# Patient Record
Sex: FEMALE | Race: WHITE | NOT HISPANIC OR LATINO | Employment: FULL TIME | URBAN - METROPOLITAN AREA
[De-identification: names, ages, dates, MRNs, and addresses within clinical notes are randomized per-mention and may not be internally consistent; named-entity substitution may affect disease eponyms.]

---

## 2018-10-14 ENCOUNTER — OFFICE VISIT (OUTPATIENT)
Dept: URGENT CARE | Facility: PHYSICIAN GROUP | Age: 43
End: 2018-10-14
Payer: OTHER GOVERNMENT

## 2018-10-14 VITALS
DIASTOLIC BLOOD PRESSURE: 80 MMHG | HEIGHT: 64 IN | SYSTOLIC BLOOD PRESSURE: 130 MMHG | TEMPERATURE: 95.7 F | HEART RATE: 77 BPM | WEIGHT: 235 LBS | BODY MASS INDEX: 40.12 KG/M2 | OXYGEN SATURATION: 96 % | RESPIRATION RATE: 16 BRPM

## 2018-10-14 DIAGNOSIS — L73.2 HIDRADENITIS SUPPURATIVA OF LEFT AXILLA: ICD-10-CM

## 2018-10-14 PROCEDURE — 99203 OFFICE O/P NEW LOW 30 MIN: CPT | Performed by: PHYSICIAN ASSISTANT

## 2018-10-14 RX ORDER — PROPRANOLOL HYDROCHLORIDE 20 MG/1
TABLET ORAL
COMMUNITY
Start: 2018-08-22

## 2018-10-14 RX ORDER — DOXYCYCLINE HYCLATE 100 MG
TABLET ORAL
COMMUNITY
Start: 2018-08-22

## 2018-10-14 ASSESSMENT — ENCOUNTER SYMPTOMS
FEVER: 0
CHILLS: 0
SENSORY CHANGE: 0
NAUSEA: 0
SHORTNESS OF BREATH: 0
VOMITING: 0
MYALGIAS: 0
FOCAL WEAKNESS: 0

## 2018-10-14 NOTE — PROGRESS NOTES
"Subjective:   Zeina Tubbs is a 43 y.o. female who presents for Cyst (under arm; popped 9 days ago )        Cyst   This is a new problem. The current episode started 1 to 4 weeks ago. Pertinent negatives include no chills, fever, myalgias, nausea, rash or vomiting.     Pt would like check up for PMH of hydraenitis suppurativa - notes lesion to left axilla that \"popped\" 9d ago, she then noted clear drainage from wound, has been tx'ing w/ abx oint OTC, denies feeling sick, denies fever/chills/body aches/nauesa. Has stated doxy 100mg qd she has on hand from PCP 4d ago. Has clinda topical but has not felt the need to use. Denies PMH of cx MRSA from wound. Denies any erythema or purulent appearing drainage now or recently.      Review of Systems   Constitutional: Negative for chills and fever.   Respiratory: Negative for shortness of breath.    Gastrointestinal: Negative for nausea and vomiting.   Musculoskeletal: Negative for myalgias.   Skin: Negative for itching and rash.        POS for lesion to left axilla   Neurological: Negative for sensory change and focal weakness.     Allergies   Allergen Reactions   • Minocycline    • Sulfa Drugs       Objective:   /80 (BP Location: Left arm, Patient Position: Sitting, BP Cuff Size: Adult)   Pulse 77   Temp (!) 35.4 °C (95.7 °F) (Temporal)   Resp 16   Ht 1.626 m (5' 4\")   Wt 106.6 kg (235 lb)   SpO2 96%   BMI 40.34 kg/m²   Physical Exam   Constitutional: She is oriented to person, place, and time. She appears well-developed and well-nourished. No distress.   HENT:   Head: Normocephalic and atraumatic.   Right Ear: External ear normal.   Left Ear: External ear normal.   Nose: Nose normal.   Eyes: Conjunctivae and lids are normal. Right eye exhibits no discharge. Left eye exhibits no discharge. No scleral icterus.   Neck: Neck supple.   Pulmonary/Chest: Effort normal. No respiratory distress.   Musculoskeletal: Normal range of motion.   Neurological: She is alert " and oriented to person, place, and time. She is not disoriented.   Skin: Skin is warm and dry. No rash noted. She is not diaphoretic. No erythema. No pallor.        Psychiatric: Her speech is normal and behavior is normal.   Nursing note and vitals reviewed.        Assessment/Plan:   Assessment    1. Hidradenitis suppurativa of left axilla    Other orders  - propranolol (INDERAL) 20 MG Tab;   - doxycycline (VIBRAMYCIN) 100 MG Tab;   Continue current management with daily doxycycline, with onset of fevers chills body aches her appearance of redness extending out from lesion consider contacting primary care for increased dose, no concerns today  Return to clinic with lack of resolution or progression of symptoms.    Differential diagnosis, natural history, supportive care, and indications for immediate follow-up discussed.

## 2019-10-29 ENCOUNTER — OFFICE VISIT (OUTPATIENT)
Dept: URGENT CARE | Facility: PHYSICIAN GROUP | Age: 44
End: 2019-10-29
Payer: OTHER GOVERNMENT

## 2019-10-29 ENCOUNTER — HOSPITAL ENCOUNTER (OUTPATIENT)
Dept: RADIOLOGY | Facility: MEDICAL CENTER | Age: 44
End: 2019-10-29
Attending: PHYSICIAN ASSISTANT
Payer: OTHER GOVERNMENT

## 2019-10-29 VITALS
HEIGHT: 64 IN | OXYGEN SATURATION: 94 % | WEIGHT: 230 LBS | BODY MASS INDEX: 39.27 KG/M2 | RESPIRATION RATE: 16 BRPM | HEART RATE: 84 BPM | TEMPERATURE: 97.4 F | SYSTOLIC BLOOD PRESSURE: 122 MMHG | DIASTOLIC BLOOD PRESSURE: 84 MMHG

## 2019-10-29 DIAGNOSIS — M54.50 LUMBAR PAIN: ICD-10-CM

## 2019-10-29 DIAGNOSIS — S39.012A LUMBAR STRAIN, INITIAL ENCOUNTER: ICD-10-CM

## 2019-10-29 PROCEDURE — 72100 X-RAY EXAM L-S SPINE 2/3 VWS: CPT

## 2019-10-29 PROCEDURE — 99214 OFFICE O/P EST MOD 30 MIN: CPT | Performed by: PHYSICIAN ASSISTANT

## 2019-10-29 RX ORDER — CYCLOBENZAPRINE HCL 5 MG
5-10 TABLET ORAL 3 TIMES DAILY PRN
Qty: 30 TAB | Refills: 0 | Status: SHIPPED | OUTPATIENT
Start: 2019-10-29 | End: 2019-11-18

## 2019-10-29 RX ORDER — PREDNISONE 20 MG/1
20 TABLET ORAL DAILY
Qty: 5 TAB | Refills: 0 | Status: SHIPPED | OUTPATIENT
Start: 2019-10-29 | End: 2019-11-03

## 2019-10-29 RX ORDER — PAROXETINE HYDROCHLORIDE 20 MG/1
TABLET, FILM COATED ORAL
Refills: 4 | COMMUNITY
Start: 2019-09-13

## 2019-10-29 ASSESSMENT — ENCOUNTER SYMPTOMS
BACK PAIN: 1
FEVER: 0
PARESTHESIAS: 0
TINGLING: 0
ABDOMINAL PAIN: 0
LEG PAIN: 0
PARESIS: 0
HEADACHES: 0
WEIGHT LOSS: 0
NUMBNESS: 0
PERIANAL NUMBNESS: 0
WEAKNESS: 0
BOWEL INCONTINENCE: 0

## 2019-10-29 NOTE — PROGRESS NOTES
Subjective:   Zeina Tubbs is a 44 y.o. female who presents for Low Back Pain (possible [id number]/neck pain)        Patient has struggled with low back pain over the years.  She does have history of sciatica.  Symptoms have been well controlled.  She recently reinjured back during heavy lifting while moving 6 weeks ago.  Symptoms initially improved but then worsened.  Since initial injury they have waxed and waned.  She feels like heat and naproxen are not adequately relieving symptoms.    Back Pain   This is a new problem. The current episode started more than 1 month ago. The problem has been waxing and waning since onset. The pain is present in the lumbar spine and sacro-iliac. The pain does not radiate. The pain is at a severity of 6/10. The pain is moderate. The pain is the same all the time. The symptoms are aggravated by bending, twisting, sitting and position. Pertinent negatives include no abdominal pain, bladder incontinence, bowel incontinence, chest pain, dysuria, fever, headaches, leg pain, numbness, paresis, paresthesias, pelvic pain, perianal numbness, tingling, weakness or weight loss. Risk factors include obesity. She has tried NSAIDs, heat and bed rest for the symptoms. The treatment provided no relief.     Review of Systems   Constitutional: Negative for fever and weight loss.   Cardiovascular: Negative for chest pain.   Gastrointestinal: Negative for abdominal pain and bowel incontinence.   Genitourinary: Negative for bladder incontinence, dysuria and pelvic pain.   Musculoskeletal: Positive for back pain.   Neurological: Negative for tingling, weakness, numbness, headaches and paresthesias.       PMH:  has no past medical history of Diabetes.  MEDS:   Current Outpatient Medications:   •  PARoxetine (PAXIL) 20 MG Tab, TK 1 T PO D, Disp: , Rfl: 4  •  predniSONE (DELTASONE) 20 MG Tab, Take 1 Tab by mouth every day for 5 days., Disp: 5 Tab, Rfl: 0  •  cyclobenzaprine (FLEXERIL) 5 MG tablet,  "Take 1-2 Tabs by mouth 3 times a day as needed for up to 20 days., Disp: 30 Tab, Rfl: 0  •  propranolol (INDERAL) 20 MG Tab, , Disp: , Rfl:   •  naproxen (NAPROSYN) 500 MG TABS, Take 500 mg by mouth 2 times a day, with meals., Disp: , Rfl:   •  doxycycline (VIBRAMYCIN) 100 MG Tab, , Disp: , Rfl:   •  alprazolam (XANAX) 0.25 MG TABS, Take 0.25 mg by mouth at bedtime as needed., Disp: , Rfl:   •  vitamin D, Ergocalciferol, (DRISDOL) 33731 UNITS CAPS capsule, Take  by mouth every 7 days., Disp: , Rfl:   ALLERGIES:   Allergies   Allergen Reactions   • Minocycline    • Sulfa Drugs      SURGHX: History reviewed. No pertinent surgical history.  SOCHX:  reports that she has never smoked. She has never used smokeless tobacco. She reports that she does not drink alcohol.  FH: Family history was reviewed, no pertinent findings to report   Objective:   /84   Pulse 84   Temp 36.3 °C (97.4 °F) (Temporal)   Resp 16   Ht 1.626 m (5' 4\")   Wt 104.3 kg (230 lb)   SpO2 94%   BMI 39.48 kg/m²   Physical Exam   Constitutional: She is oriented to person, place, and time. She appears well-developed and well-nourished.  Non-toxic appearance. No distress.   HENT:   Head: Normocephalic and atraumatic.   Right Ear: External ear normal.   Left Ear: External ear normal.   Nose: Nose normal.   Eyes: Conjunctivae and lids are normal.   Neck: Neck supple.   Cardiovascular: Normal rate and regular rhythm.   Pulmonary/Chest: Effort normal and breath sounds normal. No respiratory distress.   Abdominal: Normal appearance.   Musculoskeletal:   Back:  General: No asymmetry, bruising, or erythema appreciated  ROM: flexion 20°, extension 30°, lateral bend 20°, lateral twist 30°  Palpation: L4 and L5 moderately tender to palpation. Lumbar paraspinals tender to palpation.  no significant scoliosis appreciated  Strength: 5/5 hip flexion/extension  Special tests: Straight leg raise -   Neuro: Sensation intact and equal bilaterally in LE's "   Neurological: She is alert and oriented to person, place, and time. No cranial nerve deficit or sensory deficit.   Reflex Scores:       Patellar reflexes are 2+ on the right side and 2+ on the left side.  Skin: Skin is warm, dry and intact. Capillary refill takes less than 2 seconds.   Psychiatric: She has a normal mood and affect. Her speech is normal and behavior is normal. Judgment and thought content normal. Cognition and memory are normal.   Vitals reviewed.        Assessment/Plan:   1. Lumbar strain, initial encounter  - predniSONE (DELTASONE) 20 MG Tab; Take 1 Tab by mouth every day for 5 days.  Dispense: 5 Tab; Refill: 0  - cyclobenzaprine (FLEXERIL) 5 MG tablet; Take 1-2 Tabs by mouth 3 times a day as needed for up to 20 days.  Dispense: 30 Tab; Refill: 0    2. Lumbar pain  - DX-LUMBAR SPINE-2 OR 3 VIEWS; Future    Other orders  - PARoxetine (PAXIL) 20 MG Tab; TK 1 T PO D; Refill: 4    Due to duration of symptoms I will obtain an x-ray to further evaluate.  XR: anterolisthesis of L4 possibly by my read  Radiology review:    FINDINGS:  The lumbar vertebral bodies are normal in height.    Subluxation at L4-5 measures 5 mm    Disk spaces are maintained.    There is multilevel facet arthropathy.    The visualized portions of the abdomen are within normal limits.      Impression       1.  L4-5 subluxation measuring 5 mm which is likely degenerative in etiology.    2.  Multilevel facet arthropathy     Images reviewed with patient.  Advised her that subluxation is most likely degenerative versus acute.  However I would like her to be reevaluated by her PCP next week.     No red flag symptoms.  No bony tenderness or significant trauma.  Radiological imaging not indicated at this time.    Patient instructed to rest and avoid aggravating activities.  Apply warm, damp heat to the affected area and perform gentle stretches as instructed in clinic.  As symptoms improve patient may increase activity.    Muscle relaxers  as needed for muscle spasm.  Patient was advised not to drive, operate machinery, or drink alcohol taking this medication.  Start 5-day course of prednisone.  She may transition back to naproxen after completing course.    If symptoms worsen or fail to improve patient instructed to follow-up with PCP or return to clinic for reevaluation.  If patient develops red flag symptoms such as urinary retention, loss of bowel or bladder control, perianal numbness or tingling, lower extremity weakness-patient was instructed to go to the ED for further evaluation.    Differential diagnosis, natural history, supportive care, and indications for immediate follow-up discussed.

## 2019-10-29 NOTE — LETTER
October 29, 2019    To Whom It May Concern:         This is confirmation that Zeina Tubbs attended her scheduled appointment with Barry Pope P.A.-C. on 10/29/19. Please excuse her from work on 10/28-10/30.         If you have any questions please do not hesitate to call me at the phone number listed below.    Sincerely,          Barry Pope P.A.-C.  [phone]

## 2019-11-05 ENCOUNTER — OFFICE VISIT (OUTPATIENT)
Dept: URGENT CARE | Facility: PHYSICIAN GROUP | Age: 44
End: 2019-11-05
Payer: OTHER GOVERNMENT

## 2019-11-05 VITALS
HEART RATE: 80 BPM | OXYGEN SATURATION: 96 % | BODY MASS INDEX: 39.95 KG/M2 | HEIGHT: 64 IN | WEIGHT: 234 LBS | SYSTOLIC BLOOD PRESSURE: 126 MMHG | DIASTOLIC BLOOD PRESSURE: 84 MMHG | TEMPERATURE: 97.6 F

## 2019-11-05 DIAGNOSIS — M54.42 ACUTE LEFT-SIDED LOW BACK PAIN WITH LEFT-SIDED SCIATICA: ICD-10-CM

## 2019-11-05 PROCEDURE — 99214 OFFICE O/P EST MOD 30 MIN: CPT | Performed by: PHYSICIAN ASSISTANT

## 2019-11-05 RX ORDER — KETOROLAC TROMETHAMINE 30 MG/ML
30 INJECTION, SOLUTION INTRAMUSCULAR; INTRAVENOUS ONCE
Status: COMPLETED | OUTPATIENT
Start: 2019-11-05 | End: 2019-11-05

## 2019-11-05 RX ORDER — IBUPROFEN 800 MG/1
800 TABLET ORAL EVERY 8 HOURS PRN
COMMUNITY

## 2019-11-05 RX ORDER — KETOROLAC TROMETHAMINE 30 MG/ML
30 INJECTION, SOLUTION INTRAMUSCULAR; INTRAVENOUS ONCE
Status: DISCONTINUED | OUTPATIENT
Start: 2019-11-05 | End: 2019-11-05

## 2019-11-05 RX ADMIN — KETOROLAC TROMETHAMINE 30 MG: 30 INJECTION, SOLUTION INTRAMUSCULAR; INTRAVENOUS at 09:21

## 2019-11-05 ASSESSMENT — ENCOUNTER SYMPTOMS
BLURRED VISION: 0
WEIGHT LOSS: 0
CHILLS: 0
ABDOMINAL PAIN: 0
TINGLING: 0
SHORTNESS OF BREATH: 0
PALPITATIONS: 0
VOMITING: 0
HEADACHES: 0
FEVER: 0
SENSORY CHANGE: 0
NAUSEA: 0
BACK PAIN: 1

## 2019-11-05 NOTE — PROGRESS NOTES
Subjective:      Zeina Tubbs is a 44 y.o. female who presents with Low Back Pain (pain not resolved, L sided low back pain radiating down leg)    HPI:  Patient was initially seen on 10/29/2019 for evaluation of low back pain.  Approximately 2 months ago she injured her back for doing some heavy lifting during a move.  Patient had an x-ray done at her previous visit which showed L4-5 subluxation measuring 5 mm and multilevel facet arthropathy.  Exam is most consistent with muscular strain, however.  Patient was treated with a short course of prednisone and given cyclobenzaprine to use as needed.  Patient states that she was improving for short time and the pain in her back itself actually has gotten better but now she is getting pain that shoots down into her posterior left thigh to the level of her knee.  She is unable to get in to see her PCP until January 3.  No bowel/bladder incontinence, fever/chills, abdominal pain, urinary symptoms, focal weakness, or saddle anesthesia.  No new trauma      Review of Systems   Constitutional: Negative for chills, fever and weight loss.   Eyes: Negative for blurred vision.   Respiratory: Negative for shortness of breath.    Cardiovascular: Negative for chest pain and palpitations.   Gastrointestinal: Negative for abdominal pain, nausea and vomiting.   Musculoskeletal: Positive for back pain.   Neurological: Negative for tingling, sensory change and headaches.       PMH:  has no past medical history of Diabetes.  MEDS:   Current Outpatient Medications:   •  ibuprofen (MOTRIN) 800 MG Tab, Take 800 mg by mouth every 8 hours as needed., Disp: , Rfl:   •  cyclobenzaprine (FLEXERIL) 5 MG tablet, Take 1-2 Tabs by mouth 3 times a day as needed for up to 20 days., Disp: 30 Tab, Rfl: 0  •  PARoxetine (PAXIL) 20 MG Tab, TK 1 T PO D, Disp: , Rfl: 4  •  propranolol (INDERAL) 20 MG Tab, , Disp: , Rfl:   •  doxycycline (VIBRAMYCIN) 100 MG Tab, , Disp: , Rfl:   •  naproxen (NAPROSYN) 500 MG  "TABS, Take 500 mg by mouth 2 times a day, with meals., Disp: , Rfl:   •  alprazolam (XANAX) 0.25 MG TABS, Take 0.25 mg by mouth at bedtime as needed., Disp: , Rfl:   •  vitamin D, Ergocalciferol, (DRISDOL) 30849 UNITS CAPS capsule, Take  by mouth every 7 days., Disp: , Rfl:     Current Facility-Administered Medications:   •  ketorolac (TORADOL) injection 30 mg, 30 mg, Intramuscular, Once, MYLES RamirezAKAEL  ALLERGIES:   Allergies   Allergen Reactions   • Minocycline    • Sulfa Drugs      SURGHX: No past surgical history on file.  SOCHX:  reports that she has never smoked. She has never used smokeless tobacco. She reports that she does not drink alcohol.  FH: Family history was reviewed, no pertinent findings to report     Objective:     /84   Pulse 80   Temp 36.4 °C (97.6 °F) (Temporal)   Ht 1.626 m (5' 4\")   Wt 106.1 kg (234 lb)   SpO2 96%   BMI 40.17 kg/m²      Physical Exam  Constitutional:       Appearance: She is well-developed.   HENT:      Head: Normocephalic and atraumatic.      Right Ear: External ear normal.      Left Ear: External ear normal.   Eyes:      Conjunctiva/sclera: Conjunctivae normal.      Pupils: Pupils are equal, round, and reactive to light.   Cardiovascular:      Rate and Rhythm: Normal rate and regular rhythm.      Heart sounds: Normal heart sounds. No murmur.   Pulmonary:      Effort: Pulmonary effort is normal.      Breath sounds: Normal breath sounds. No wheezing.   Musculoskeletal:      Lumbar back: She exhibits decreased range of motion, tenderness and spasm. She exhibits no bony tenderness.        Back:    Skin:     General: Skin is warm and dry.      Capillary Refill: Capillary refill takes less than 2 seconds.   Neurological:      Mental Status: She is alert and oriented to person, place, and time.   Psychiatric:         Behavior: Behavior normal.         Judgment: Judgment normal.            Assessment/Plan:     1. Acute left-sided low back pain with " left-sided sciatica  - ketorolac (TORADOL) injection 30 mg  -Alternate ice/heat  -Light stretching exercises as discussed  -Continue cyclobenzaprine as needed  -Follow-up with sports medicine for further evaluation          Differential Diagnosis, natural history, and supportive care discussed. Return to the Urgent Care or follow up with your PCP if symptoms fail to resolve, or for any new or worsening symptoms. Emergency room precautions discussed. Patient and/or family appears understanding of information.

## 2019-11-05 NOTE — LETTER
November 5, 2019         Patient: Zeina Tubbs   YOB: 1975   Date of Visit: 11/5/2019           To Whom it May Concern:    Zeina Tubbs was seen in my clinic on 11/5/2019.     If you have any questions or concerns, please don't hesitate to call.        Sincerely,           Laverne Garcia P.A.-C.  Electronically Signed

## 2022-05-17 ENCOUNTER — OFFICE VISIT (OUTPATIENT)
Dept: URGENT CARE | Facility: PHYSICIAN GROUP | Age: 47
End: 2022-05-17
Payer: OTHER GOVERNMENT

## 2022-05-17 VITALS
HEIGHT: 64 IN | HEART RATE: 80 BPM | WEIGHT: 235 LBS | RESPIRATION RATE: 16 BRPM | SYSTOLIC BLOOD PRESSURE: 118 MMHG | TEMPERATURE: 98 F | BODY MASS INDEX: 40.12 KG/M2 | OXYGEN SATURATION: 95 % | DIASTOLIC BLOOD PRESSURE: 74 MMHG

## 2022-05-17 DIAGNOSIS — J11.1 INFLUENZA: ICD-10-CM

## 2022-05-17 PROCEDURE — 99213 OFFICE O/P EST LOW 20 MIN: CPT | Performed by: PHYSICIAN ASSISTANT

## 2022-05-17 RX ORDER — ALBUTEROL SULFATE 90 UG/1
2 AEROSOL, METERED RESPIRATORY (INHALATION) EVERY 6 HOURS PRN
Qty: 8.5 G | Refills: 0 | Status: SHIPPED | OUTPATIENT
Start: 2022-05-17

## 2022-05-17 RX ORDER — BENZONATATE 100 MG/1
200 CAPSULE ORAL 3 TIMES DAILY PRN
Qty: 60 CAPSULE | Refills: 0 | Status: SHIPPED | OUTPATIENT
Start: 2022-05-17

## 2022-05-17 ASSESSMENT — ENCOUNTER SYMPTOMS
MYALGIAS: 1
ABDOMINAL PAIN: 0
SORE THROAT: 0
COUGH: 1
PALPITATIONS: 0
DIARRHEA: 0
VOMITING: 0
FEVER: 1
NAUSEA: 1
WHEEZING: 0
CHILLS: 1
DIZZINESS: 0
DIAPHORESIS: 0
HEADACHES: 1
SPUTUM PRODUCTION: 1
SINUS PAIN: 0
SHORTNESS OF BREATH: 0

## 2022-05-17 NOTE — LETTER
[facility] URGENT [facility] [place] [id number] [facility] [id number]     May 17, 2022    Patient: Zeina Tubbs   YOB: 1975   Date of Visit: 5/17/2022       To Whom It May Concern:    Zeina Tubbs was seen and treated in our department on 5/17/2022.  Please excuse her absence from work on 5/17/2022 through 5/18/2022.  Cleared to return to work on 5/19/2022 as long as she is afebrile.  Call with questions or concerns at [phone].    Sincerely,     Juni Reynolds P.A.-C.

## 2022-05-17 NOTE — PROGRESS NOTES
Subjective:     CHIEF COMPLAINT  Chief Complaint   Patient presents with   • Cough     HEADACHE, FATIGUE, NAUSEA, SOB X4 DAYS       HPI  Zeina Tubbs is a very pleasant 46 y.o. female who presents to the clinic with flulike symptoms x4 days.  Patient's son was seen in clinic last Thursday.  He tested positive for influenza A.  Her symptoms began the following day.  She describes generalized body aches, chills, fatigue and fever.  She has also had sinus congestion and cough.  Mild shortness of breath after coughing fits.  Cough was dry at the onset and is gradually becoming more productive.  She has been taking Mucinex, DayQuil, NyQuil, Robitussin and OTC antihistamines with minimal improvement.    REVIEW OF SYSTEMS  Review of Systems   Constitutional: Positive for chills, fever and malaise/fatigue. Negative for diaphoresis.   HENT: Positive for congestion. Negative for ear pain, sinus pain and sore throat.    Respiratory: Positive for cough and sputum production. Negative for shortness of breath and wheezing.    Cardiovascular: Negative for chest pain and palpitations.   Gastrointestinal: Positive for nausea. Negative for abdominal pain, diarrhea and vomiting.   Musculoskeletal: Positive for myalgias.   Neurological: Positive for headaches. Negative for dizziness.   Endo/Heme/Allergies: Negative for environmental allergies.       PAST MEDICAL HISTORY  There are no problems to display for this patient.      SURGICAL HISTORY  patient denies any surgical history    ALLERGIES  Allergies   Allergen Reactions   • Minocycline    • Sulfa Drugs        CURRENT MEDICATIONS  Home Medications     Reviewed by Juni Reynolds P.A.-C. (Physician Assistant) on 05/17/22 at 1504  Med List Status: <None>   Medication Last Dose Status   alprazolam (XANAX) 0.25 MG TABS Not Taking Active   doxycycline (VIBRAMYCIN) 100 MG Tab PRN Active   ibuprofen (MOTRIN) 800 MG Tab PRN Active   naproxen (NAPROSYN) 500 MG TABS PRN Active   PARoxetine  "(PAXIL) 20 MG Tab Taking Active   propranolol (INDERAL) 20 MG Tab Taking Active   vitamin D, Ergocalciferol, (DRISDOL) 38374 UNITS CAPS capsule Not Taking Active                SOCIAL HISTORY  Social History     Tobacco Use   • Smoking status: Never Smoker   • Smokeless tobacco: Never Used   Vaping Use   • Vaping Use: Never used   Substance and Sexual Activity   • Alcohol use: No   • Drug use: Never   • Sexual activity: Not on file       FAMILY HISTORY  History reviewed. No pertinent family history.       Objective:     VITAL SIGNS: /74   Pulse 80   Temp 36.7 °C (98 °F) (Temporal)   Resp 16   Ht 1.626 m (5' 4\")   Wt 107 kg (235 lb)   SpO2 95%   BMI 40.34 kg/m²     PHYSICAL EXAM  Physical Exam  Constitutional:       General: She is not in acute distress.     Appearance: Normal appearance. She is not ill-appearing, toxic-appearing or diaphoretic.   HENT:      Head: Normocephalic and atraumatic.      Right Ear: Tympanic membrane, ear canal and external ear normal.      Left Ear: Tympanic membrane, ear canal and external ear normal.      Nose: Congestion present. No rhinorrhea.      Mouth/Throat:      Mouth: Mucous membranes are moist.      Pharynx: No oropharyngeal exudate or posterior oropharyngeal erythema.   Eyes:      Conjunctiva/sclera: Conjunctivae normal.   Cardiovascular:      Rate and Rhythm: Normal rate and regular rhythm.      Pulses: Normal pulses.      Heart sounds: Normal heart sounds.   Pulmonary:      Effort: Pulmonary effort is normal.      Breath sounds: Normal breath sounds. No wheezing, rhonchi or rales.   Musculoskeletal:      Cervical back: Normal range of motion. No muscular tenderness.   Lymphadenopathy:      Cervical: No cervical adenopathy.   Skin:     General: Skin is warm and dry.      Capillary Refill: Capillary refill takes less than 2 seconds.   Neurological:      Mental Status: She is alert.   Psychiatric:         Mood and Affect: Mood normal.         Thought Content: " Thought content normal.         Assessment/Plan:     1. Influenza  - albuterol 108 (90 Base) MCG/ACT Aero Soln inhalation aerosol; Inhale 2 Puffs every 6 hours as needed for Shortness of Breath.  Dispense: 8.5 g; Refill: 0  - benzonatate (TESSALON) 100 MG Cap; Take 2 Capsules by mouth 3 times a day as needed for Cough.  Dispense: 60 Capsule; Refill: 0      MDM/Comments:    - It has been >48 hours since symptoms onset, no benefit to treating with Tamiflu  - Increased rest and fluids  - OTC ibuprofen or tylenol as needed for fever control  - Encouraged frequent handwashing for him and his entire family  - Encouraged to wear a mask in public until he is feeling better    - Advised to have any close contacts come in for flu testing promptly if they come down with any symptoms  - Discussed possible complication of pneumonia, encouraged to present to clinic or go to ER if cough does not resolve after 7-10 days, it becomes productive in nature, he experiences difficulty breathing, or there is worsening fever      Differential diagnosis, natural history, supportive care, and indications for immediate follow-up discussed. All questions answered. Patient agrees with the plan of care.    Follow-up as needed if symptoms worsen or fail to improve to PCP, Urgent care or Emergency Room.    I have personally reviewed prior external notes and test results pertinent to today's visit.  I have independently reviewed and interpreted all diagnostics ordered during this urgent care acute visit.   Discussed management options (risks,benefits, and alternatives to treatment). Pt expresses understanding and the treatment plan was agreed upon. Questions were encouraged and answered to pt's satisfaction.    Please note that this dictation was created using voice recognition software. I have made a reasonable attempt to correct obvious errors, but I expect that there are errors of grammar and possibly content that I did not discover before  finalizing the note.

## 2024-01-20 ENCOUNTER — HOSPITAL ENCOUNTER (OUTPATIENT)
Dept: LAB | Facility: MEDICAL CENTER | Age: 49
End: 2024-01-20
Attending: FAMILY MEDICINE
Payer: OTHER GOVERNMENT

## 2024-01-20 LAB
ALBUMIN SERPL BCP-MCNC: 4.2 G/DL (ref 3.2–4.9)
ALBUMIN/GLOB SERPL: 1.2 G/DL
ALP SERPL-CCNC: 60 U/L (ref 30–99)
ALT SERPL-CCNC: 23 U/L (ref 2–50)
ANION GAP SERPL CALC-SCNC: 11 MMOL/L (ref 7–16)
AST SERPL-CCNC: 19 U/L (ref 12–45)
BASOPHILS # BLD AUTO: 0.6 % (ref 0–1.8)
BASOPHILS # BLD: 0.05 K/UL (ref 0–0.12)
BILIRUB SERPL-MCNC: 0.5 MG/DL (ref 0.1–1.5)
BUN SERPL-MCNC: 11 MG/DL (ref 8–22)
CALCIUM ALBUM COR SERPL-MCNC: 9 MG/DL (ref 8.5–10.5)
CALCIUM SERPL-MCNC: 9.2 MG/DL (ref 8.5–10.5)
CHLORIDE SERPL-SCNC: 107 MMOL/L (ref 96–112)
CHOLEST SERPL-MCNC: 142 MG/DL (ref 100–199)
CO2 SERPL-SCNC: 21 MMOL/L (ref 20–33)
CREAT SERPL-MCNC: 0.61 MG/DL (ref 0.5–1.4)
EOSINOPHIL # BLD AUTO: 0.22 K/UL (ref 0–0.51)
EOSINOPHIL NFR BLD: 2.5 % (ref 0–6.9)
ERYTHROCYTE [DISTWIDTH] IN BLOOD BY AUTOMATED COUNT: 41.3 FL (ref 35.9–50)
EST. AVERAGE GLUCOSE BLD GHB EST-MCNC: 105 MG/DL
FASTING STATUS PATIENT QL REPORTED: NORMAL
GFR SERPLBLD CREATININE-BSD FMLA CKD-EPI: 111 ML/MIN/1.73 M 2
GLOBULIN SER CALC-MCNC: 3.4 G/DL (ref 1.9–3.5)
GLUCOSE SERPL-MCNC: 94 MG/DL (ref 65–99)
HBA1C MFR BLD: 5.3 % (ref 4–5.6)
HCT VFR BLD AUTO: 43.7 % (ref 37–47)
HDLC SERPL-MCNC: 49 MG/DL
HGB BLD-MCNC: 15.3 G/DL (ref 12–16)
IMM GRANULOCYTES # BLD AUTO: 0.02 K/UL (ref 0–0.11)
IMM GRANULOCYTES NFR BLD AUTO: 0.2 % (ref 0–0.9)
LDLC SERPL CALC-MCNC: 81 MG/DL
LYMPHOCYTES # BLD AUTO: 1.84 K/UL (ref 1–4.8)
LYMPHOCYTES NFR BLD: 21.3 % (ref 22–41)
MCH RBC QN AUTO: 31.6 PG (ref 27–33)
MCHC RBC AUTO-ENTMCNC: 35 G/DL (ref 32.2–35.5)
MCV RBC AUTO: 90.3 FL (ref 81.4–97.8)
MONOCYTES # BLD AUTO: 0.44 K/UL (ref 0–0.85)
MONOCYTES NFR BLD AUTO: 5.1 % (ref 0–13.4)
NEUTROPHILS # BLD AUTO: 6.08 K/UL (ref 1.82–7.42)
NEUTROPHILS NFR BLD: 70.3 % (ref 44–72)
NRBC # BLD AUTO: 0 K/UL
NRBC BLD-RTO: 0 /100 WBC (ref 0–0.2)
PLATELET # BLD AUTO: 367 K/UL (ref 164–446)
PMV BLD AUTO: 9.9 FL (ref 9–12.9)
POTASSIUM SERPL-SCNC: 4.2 MMOL/L (ref 3.6–5.5)
PROT SERPL-MCNC: 7.6 G/DL (ref 6–8.2)
RBC # BLD AUTO: 4.84 M/UL (ref 4.2–5.4)
SODIUM SERPL-SCNC: 139 MMOL/L (ref 135–145)
T4 SERPL-MCNC: 7.6 UG/DL (ref 4–12)
TRIGL SERPL-MCNC: 58 MG/DL (ref 0–149)
TSH SERPL DL<=0.005 MIU/L-ACNC: 0.47 UIU/ML (ref 0.38–5.33)
WBC # BLD AUTO: 8.7 K/UL (ref 4.8–10.8)

## 2024-01-20 PROCEDURE — 80061 LIPID PANEL: CPT

## 2024-01-20 PROCEDURE — 83036 HEMOGLOBIN GLYCOSYLATED A1C: CPT

## 2024-01-20 PROCEDURE — 36415 COLL VENOUS BLD VENIPUNCTURE: CPT

## 2024-01-20 PROCEDURE — 85025 COMPLETE CBC W/AUTO DIFF WBC: CPT

## 2024-01-20 PROCEDURE — 84443 ASSAY THYROID STIM HORMONE: CPT

## 2024-01-20 PROCEDURE — 80053 COMPREHEN METABOLIC PANEL: CPT

## 2024-01-20 PROCEDURE — 84436 ASSAY OF TOTAL THYROXINE: CPT

## 2024-09-07 ENCOUNTER — OFFICE VISIT (OUTPATIENT)
Dept: URGENT CARE | Facility: PHYSICIAN GROUP | Age: 49
End: 2024-09-07
Payer: OTHER GOVERNMENT

## 2024-09-07 VITALS
HEART RATE: 89 BPM | RESPIRATION RATE: 14 BRPM | BODY MASS INDEX: 39.4 KG/M2 | HEIGHT: 64 IN | OXYGEN SATURATION: 96 % | SYSTOLIC BLOOD PRESSURE: 114 MMHG | TEMPERATURE: 97.7 F | WEIGHT: 230.8 LBS | DIASTOLIC BLOOD PRESSURE: 80 MMHG

## 2024-09-07 DIAGNOSIS — R30.0 DYSURIA: ICD-10-CM

## 2024-09-07 DIAGNOSIS — N30.01 ACUTE CYSTITIS WITH HEMATURIA: ICD-10-CM

## 2024-09-07 LAB
APPEARANCE UR: NORMAL
BILIRUB UR STRIP-MCNC: NEGATIVE MG/DL
COLOR UR AUTO: YELLOW
GLUCOSE UR STRIP.AUTO-MCNC: NEGATIVE MG/DL
KETONES UR STRIP.AUTO-MCNC: NEGATIVE MG/DL
LEUKOCYTE ESTERASE UR QL STRIP.AUTO: NORMAL
NITRITE UR QL STRIP.AUTO: NEGATIVE
PH UR STRIP.AUTO: 7 [PH] (ref 5–8)
POCT INT CON NEG: NEGATIVE
POCT INT CON POS: POSITIVE
POCT URINE PREGNANCY TEST: NEGATIVE
PROT UR QL STRIP: 30 MG/DL
RBC UR QL AUTO: NORMAL
SP GR UR STRIP.AUTO: 1.02
UROBILINOGEN UR STRIP-MCNC: 0.2 MG/DL

## 2024-09-07 PROCEDURE — 81025 URINE PREGNANCY TEST: CPT

## 2024-09-07 PROCEDURE — 3074F SYST BP LT 130 MM HG: CPT

## 2024-09-07 PROCEDURE — 81002 URINALYSIS NONAUTO W/O SCOPE: CPT

## 2024-09-07 PROCEDURE — 99213 OFFICE O/P EST LOW 20 MIN: CPT

## 2024-09-07 PROCEDURE — 3079F DIAST BP 80-89 MM HG: CPT

## 2024-09-07 RX ORDER — PHENAZOPYRIDINE HYDROCHLORIDE 200 MG/1
200 TABLET, FILM COATED ORAL 3 TIMES DAILY
Qty: 6 TABLET | Refills: 0 | Status: SHIPPED | OUTPATIENT
Start: 2024-09-07 | End: 2024-09-09

## 2024-09-07 RX ORDER — NITROFURANTOIN 25; 75 MG/1; MG/1
100 CAPSULE ORAL 2 TIMES DAILY
Qty: 10 CAPSULE | Refills: 0 | Status: SHIPPED | OUTPATIENT
Start: 2024-09-07 | End: 2024-09-12

## 2024-09-07 ASSESSMENT — ENCOUNTER SYMPTOMS
CHILLS: 0
FLANK PAIN: 0
ABDOMINAL PAIN: 0
VOMITING: 0
NAUSEA: 0
DIARRHEA: 0
FEVER: 0

## 2024-09-07 ASSESSMENT — FIBROSIS 4 INDEX: FIB4 SCORE: 0.51

## 2024-09-07 NOTE — PROGRESS NOTES
Subjective:   Zeina Tubbs is a very pleasant 49 y.o. female who presents for:    Chief Complaint   Patient presents with    UTI     Discomfort, achy, tinged urine  X couple days        HPI:    UTI  Episode onset: two days ago, the patient developed urinary frequency and pink tinged urine. Pertinent negatives include no abdominal pain, chills, fever, nausea or vomiting. Associated symptoms comments: Dysuria, urgency, hesitancy, No vaginal irritation, discharge, fever, chills, flank pain . Treatments tried: increased hydration.       ROS:    Review of Systems   Constitutional:  Negative for chills, fever and malaise/fatigue.   Gastrointestinal:  Negative for abdominal pain, diarrhea, nausea and vomiting.   Genitourinary:  Positive for dysuria, frequency, hematuria and urgency. Negative for flank pain.   All other systems reviewed and are negative.      Medications:      Current Outpatient Medications   Medication Sig    phenazopyridine (PYRIDIUM) 200 MG Tab Take 1 Tablet by mouth 3 times a day for 2 days.    nitrofurantoin (MACROBID) 100 MG Cap Take 1 Capsule by mouth 2 times a day for 5 days.    albuterol 108 (90 Base) MCG/ACT Aero Soln inhalation aerosol Inhale 2 Puffs every 6 hours as needed for Shortness of Breath.    benzonatate (TESSALON) 100 MG Cap Take 2 Capsules by mouth 3 times a day as needed for Cough.    ibuprofen (MOTRIN) 800 MG Tab Take 800 mg by mouth every 8 hours as needed.    PARoxetine (PAXIL) 20 MG Tab TK 1 T PO D    propranolol (INDERAL) 20 MG Tab     doxycycline (VIBRAMYCIN) 100 MG Tab     naproxen (NAPROSYN) 500 MG TABS Take 500 mg by mouth 2 times a day, with meals.    alprazolam (XANAX) 0.25 MG TABS Take 0.25 mg by mouth at bedtime as needed. (Patient not taking: Reported on 5/17/2022)    vitamin D, Ergocalciferol, (DRISDOL) 84689 UNITS CAPS capsule Take  by mouth every 7 days. (Patient not taking: Reported on 5/17/2022)       Allergies:     Allergies   Allergen Reactions    Minocycline      Sulfa Drugs        Problem List:     There is no problem list on file for this patient.      Surgical History:    No past surgical history on file.    Past Social Hx:     Social History     Socioeconomic History    Marital status:    Tobacco Use    Smoking status: Every Day     Types: Cigarettes    Smokeless tobacco: Never   Vaping Use    Vaping status: Never Used   Substance and Sexual Activity    Alcohol use: No    Drug use: Never        Past Family Hx:      History reviewed. No pertinent family history.    Problem list, medications, and allergies reviewed by myself today in Epic.     Objective:     Vitals:    09/07/24 1006   BP: 114/80   Pulse: 89   Resp: 14   Temp: 36.5 °C (97.7 °F)   SpO2: 96%       Physical Exam  Vitals reviewed.   Constitutional:       General: She is not in acute distress.     Appearance: Normal appearance. She is normal weight. She is not ill-appearing, toxic-appearing or diaphoretic.   HENT:      Head: Normocephalic and atraumatic.      Nose: Nose normal.      Mouth/Throat:      Mouth: Mucous membranes are moist.   Eyes:      Extraocular Movements: Extraocular movements intact.      Conjunctiva/sclera: Conjunctivae normal.      Pupils: Pupils are equal, round, and reactive to light.   Cardiovascular:      Rate and Rhythm: Normal rate and regular rhythm.      Pulses: Normal pulses.      Heart sounds: Normal heart sounds.   Pulmonary:      Effort: Pulmonary effort is normal.   Abdominal:      General: Abdomen is flat.      Palpations: Abdomen is soft.      Tenderness: There is no abdominal tenderness. There is no right CVA tenderness, left CVA tenderness or guarding.   Musculoskeletal:         General: Normal range of motion.      Cervical back: Normal range of motion.   Skin:     General: Skin is warm and dry.   Neurological:      General: No focal deficit present.      Mental Status: She is alert and oriented to person, place, and time. Mental status is at baseline.    Psychiatric:         Mood and Affect: Mood normal.         Behavior: Behavior normal.         Thought Content: Thought content normal.             Results from POCT:   Results for orders placed or performed in visit on 09/07/24   POCT Urinalysis   Result Value Ref Range    POC Color yellow Negative    POC Appearance cloudy Negative    POC Glucose negative Negative mg/dL    POC Bilirubin negative Negative mg/dL    POC Ketones negative Negative mg/dL    POC Specific Gravity 1.020 <1.005 - >1.030    POC Blood moderate Negative    POC Urine PH 7.0 5.0 - 8.0    POC Protein 30 Negative mg/dL    POC Urobiligen 0.2 Negative (0.2) mg/dL    POC Nitrites negative Negative    POC Leukocyte Esterase small Negative   POCT Pregnancy   Result Value Ref Range    POC Urine Pregnancy Test Negative     Internal Control Positive Positive     Internal Control Negative Negative        Assessment/Plan:     Diagnosis and associated orders:     1. Dysuria  - POCT Urinalysis  - POCT Pregnancy  - phenazopyridine (PYRIDIUM) 200 MG Tab; Take 1 Tablet by mouth 3 times a day for 2 days.  Dispense: 6 Tablet; Refill: 0    2. Acute cystitis with hematuria  - POCT Urinalysis  - POCT Pregnancy  - nitrofurantoin (MACROBID) 100 MG Cap; Take 1 Capsule by mouth 2 times a day for 5 days.  Dispense: 10 Capsule; Refill: 0          Comments/MDM:     Patient was nontoxic, stable. Ambulatory. Exam as above.  Symptoms consistent with UTI  Urine culture in progress  Prescription for nitrofurantoin sent to patient's preferred pharmacy for the treatment of acute cystitis   Supportive care reviewed with patient.    Recommend to push PO fluids and electrolytes, complete course of antibiotics, NSAIDs/Tylenol, Azo as needed for dysuria, d-mannose/raw, sugar-free cranberry juice for prevention of future UTIs, observe for resolution  Reviewed external records.   Differential diagnosis considered. Overall presentation is consistent with UTI. Low suspicion for  appendicitis, gonadal torsion, STI, pyelonephritis  Discharged with instructions to obtain outpatient follow up of patient’s symptoms and findings, with strict return precautions if patient develops new or worsening symptoms.   Follow-up with PCP or UC with lack of resolution or progression of symptoms.           All questions answered. Patient verbalized understanding and is in agreement with this plan of care.     If symptoms are worsening or not improving in 3-5 days, follow-up with PCP or return to UC. Differential diagnosis, natural history, and supportive care discussed. AVS handout given and reviewed with patient. Patient educated on red flags and when to seek treatment back in ED or UC.     I personally reviewed prior external notes and test results pertinent to today's visit.  I have independently reviewed and interpreted all diagnostics ordered during this urgent care visit.     This dictation has been created using voice recognition software. The accuracy of the dictation is limited by the abilities of the software. I expect there may be some errors of grammar and possibly content. I made every attempt to manually correct the errors within my dictation. However, errors related to voice recognition software may still exist and should be interpreted within the appropriate context.    This note was electronically signed by NEELIMA Tamayo

## 2024-09-07 NOTE — PATIENT INSTRUCTIONS
D-Mannose   RAW Cranberry     Urinary Tract Infection, Adult  A urinary tract infection (UTI) is an infection of any part of the urinary tract. The urinary tract includes:  The kidneys.  The ureters.  The bladder.  The urethra.  These organs make, store, and get rid of pee (urine) in the body.  What are the causes?  This infection is caused by germs (bacteria) in your genital area. These germs grow and cause swelling (inflammation) of your urinary tract.  What increases the risk?  The following factors may make you more likely to develop this condition:  Using a small, thin tube (catheter) to drain pee.  Not being able to control when you pee or poop (incontinence).  Being female. If you are female, these things can increase the risk:  Using these methods to prevent pregnancy:  A medicine that kills sperm (spermicide).  A device that blocks sperm (diaphragm).  Having low levels of a female hormone (estrogen).  Being pregnant.  You are more likely to develop this condition if:  You have genes that add to your risk.  You are sexually active.  You take antibiotic medicines.  You have trouble peeing because of:  A prostate that is bigger than normal, if you are male.  A blockage in the part of your body that drains pee from the bladder.  A kidney stone.  A nerve condition that affects your bladder.  Not getting enough to drink.  Not peeing often enough.  You have other conditions, such as:  Diabetes.  A weak disease-fighting system (immune system).  Sickle cell disease.  Gout.  Injury of the spine.  What are the signs or symptoms?  Symptoms of this condition include:  Needing to pee right away.  Peeing small amounts often.  Pain or burning when peeing.  Blood in the pee.  Pee that smells bad or not like normal.  Trouble peeing.  Pee that is cloudy.  Fluid coming from the vagina, if you are female.  Pain in the belly or lower back.  Other symptoms include:  Vomiting.  Not feeling hungry.  Feeling mixed up (confused).  This may be the first symptom in older adults.  Being tired and grouchy (irritable).  A fever.  Watery poop (diarrhea).  How is this treated?  Taking antibiotic medicine.  Taking other medicines.  Drinking enough water.  In some cases, you may need to see a specialist.  Follow these instructions at home:    Medicines  Take over-the-counter and prescription medicines only as told by your doctor.  If you were prescribed an antibiotic medicine, take it as told by your doctor. Do not stop taking it even if you start to feel better.  General instructions  Make sure you:  Pee until your bladder is empty.  Do not hold pee for a long time.  Empty your bladder after sex.  Wipe from front to back after peeing or pooping if you are a female. Use each tissue one time when you wipe.  Drink enough fluid to keep your pee pale yellow.  Keep all follow-up visits.  Contact a doctor if:  You do not get better after 1-2 days.  Your symptoms go away and then come back.  Get help right away if:  You have very bad back pain.  You have very bad pain in your lower belly.  You have a fever.  You have chills.  You feeling like you will vomit or you vomit.  Summary  A urinary tract infection (UTI) is an infection of any part of the urinary tract.  This condition is caused by germs in your genital area.  There are many risk factors for a UTI.  Treatment includes antibiotic medicines.  Drink enough fluid to keep your pee pale yellow.  This information is not intended to replace advice given to you by your health care provider. Make sure you discuss any questions you have with your health care provider.  Document Revised: 07/30/2021 Document Reviewed: 07/30/2021  [employer] Patient Education © 2023 [employer]